# Patient Record
Sex: FEMALE | Race: WHITE | NOT HISPANIC OR LATINO | Employment: PART TIME | URBAN - METROPOLITAN AREA
[De-identification: names, ages, dates, MRNs, and addresses within clinical notes are randomized per-mention and may not be internally consistent; named-entity substitution may affect disease eponyms.]

---

## 2024-05-24 DIAGNOSIS — M26.69 TEMPOROMANDIBULAR JOINT INTERNAL DERANGEMENT: Primary | ICD-10-CM

## 2024-12-05 DIAGNOSIS — M26.623 BILATERAL TEMPOROMANDIBULAR JOINT PAIN: Primary | ICD-10-CM

## 2024-12-05 RX ORDER — CHLORHEXIDINE GLUCONATE ORAL RINSE 1.2 MG/ML
15 SOLUTION DENTAL AS NEEDED
Qty: 120 ML | Refills: 0 | Status: SHIPPED | OUTPATIENT
Start: 2024-12-05 | End: 2024-12-19

## 2024-12-05 RX ORDER — CHLORHEXIDINE GLUCONATE 40 MG/ML
SOLUTION TOPICAL DAILY
Status: SHIPPED | OUTPATIENT
Start: 2024-12-05

## 2024-12-10 ENCOUNTER — ANESTHESIA EVENT (OUTPATIENT)
Dept: OPERATING ROOM | Facility: HOSPITAL | Age: 24
End: 2024-12-10
Payer: COMMERCIAL

## 2024-12-10 RX ORDER — METHYLPHENIDATE HYDROCHLORIDE 27 MG/1
27 TABLET ORAL DAILY
COMMUNITY
Start: 2024-04-24

## 2024-12-10 RX ORDER — BUSPIRONE HYDROCHLORIDE 5 MG/1
5 TABLET ORAL 2 TIMES DAILY
COMMUNITY
Start: 2024-02-03

## 2024-12-10 NOTE — H&P
[facility] History and Physical    History Of Present Illness  Dalila Pereira is a 24 y.o. female who was a consult for evaluation for combined orthognathic/TMJ replacement surgery. She presented with years of jaw problems including pain, headaches, difficulty chewing, and pain with smiling/speaking. Symptoms have affected her QOL greatly. Her occlusion has slowly shifted and now she bites with her posterior teeth only, and that her profile is changing with her lower jaw seemingly moving backwards.       Past Medical History  She has a past medical history of ADHD, Depression, and DIOGENES (generalized anxiety disorder).    Surgical History  She has a past surgical history that includes Breast biopsy (Right, 01/08/2019).     Social History  She has no history on file for tobacco use, alcohol use, and drug use.    Family History  No family history on file.     Allergies  Patient has no known allergies.    Review of Systems  A 12-point review of systems was performed and noted be negative except for that which was mentioned in the history of present illness     Last Recorded Vitals  There were no vitals taken for this visit.     Physical Exam:  Constitutional:  No acute distress  Voice:  No hoarseness or other abnormality  Respiration:  Breathing comfortably, no stridor  Cardiovascular:  No clubbing/cyanosis/edema in hands  Eyes:  EOM intact, sclera normal  Neuro:  Alert and oriented times 3, Cranial nerves II-XII grossly intact and symmetric bilaterally  Head and Face:  Symmetric facial features  Right & Left Ear:  Normal external ear, normal hearing to voice.  Nose:  External nose midline, no bleeding or drainage  Oral Cavity/Oropharynx/Lips:  Normal mucous membranes, normal floor of mouth/tongue, no masses or lesions.   - Third molars not present  - Class II malocclusion ~10mm overjet  - JOSE 30mm  - b/l TMJ cracking during function Lt > Rt, TTP bilaterally  - Excursive and protrusive movements intact  - Orthodontic brackets in  place on upper/lower arches, square wire in place on upper  - Power chain in place on lower dentition  - Occlusal planes level  - No interdental spaces  Neck/Lymph:  No LAD, trachea midline  Psych:  Alert and oriented with appropriate mood and affect      Recent Labs:  No results found for this or any previous visit (from the past 24 hours).    Operative Plan:  Plan for bilateral temporomandibular joint arthroplasty and reconstruction with alloplastic implants, bilateral coronoidectomy, abdominal fat harvest and graft, LeFort I osteotomy, maxillomandibular fixation, possible geniohyoid myotomy, any other indicated procedures.     Dalila Garzon MD PGY1

## 2024-12-11 ENCOUNTER — HOSPITAL ENCOUNTER (OUTPATIENT)
Facility: HOSPITAL | Age: 24
LOS: 1 days | Discharge: HOME | End: 2024-12-13
Attending: DENTIST | Admitting: DENTIST
Payer: COMMERCIAL

## 2024-12-11 ENCOUNTER — ANESTHESIA (OUTPATIENT)
Dept: OPERATING ROOM | Facility: HOSPITAL | Age: 24
End: 2024-12-11
Payer: COMMERCIAL

## 2024-12-11 DIAGNOSIS — M26.01 MAXILLARY HYPERPLASIA: ICD-10-CM

## 2024-12-11 DIAGNOSIS — M26.629 ARTHRALGIA OF TEMPOROMANDIBULAR JOINT: Primary | ICD-10-CM

## 2024-12-11 DIAGNOSIS — M26.623 ARTHRALGIA OF BILATERAL TEMPOROMANDIBULAR JOINT: ICD-10-CM

## 2024-12-11 DIAGNOSIS — M26.04 MANDIBULAR HYPOPLASIA: ICD-10-CM

## 2024-12-11 DIAGNOSIS — M26.69: ICD-10-CM

## 2024-12-11 LAB
ANION GAP SERPL CALC-SCNC: 10 MMOL/L (ref 10–20)
BUN SERPL-MCNC: 16 MG/DL (ref 6–23)
CALCIUM SERPL-MCNC: 7.4 MG/DL (ref 8.6–10.3)
CHLORIDE SERPL-SCNC: 109 MMOL/L (ref 98–107)
CO2 SERPL-SCNC: 21 MMOL/L (ref 21–32)
CREAT SERPL-MCNC: 0.68 MG/DL (ref 0.5–1.05)
EGFRCR SERPLBLD CKD-EPI 2021: >90 ML/MIN/1.73M*2
GLUCOSE SERPL-MCNC: 210 MG/DL (ref 74–99)
HOLD SPECIMEN: NORMAL
POTASSIUM SERPL-SCNC: 4.1 MMOL/L (ref 3.5–5.3)
PREGNANCY TEST URINE, POC: NEGATIVE
SODIUM SERPL-SCNC: 136 MMOL/L (ref 136–145)

## 2024-12-11 PROCEDURE — 2500000004 HC RX 250 GENERAL PHARMACY W/ HCPCS (ALT 636 FOR OP/ED): Performed by: DENTIST

## 2024-12-11 PROCEDURE — 7100000001 HC RECOVERY ROOM TIME - INITIAL BASE CHARGE: Performed by: DENTIST

## 2024-12-11 PROCEDURE — 2780000003 HC OR 278 NO HCPCS: Performed by: DENTIST

## 2024-12-11 PROCEDURE — 36415 COLL VENOUS BLD VENIPUNCTURE: CPT

## 2024-12-11 PROCEDURE — A21243 PR ARTHROPLASTY TMJ+PROSTHESIS

## 2024-12-11 PROCEDURE — 1100000001 HC PRIVATE ROOM DAILY

## 2024-12-11 PROCEDURE — 3700000001 HC GENERAL ANESTHESIA TIME - INITIAL BASE CHARGE: Performed by: DENTIST

## 2024-12-11 PROCEDURE — A21243 PR ARTHROPLASTY TMJ+PROSTHESIS: Performed by: ANESTHESIOLOGY

## 2024-12-11 PROCEDURE — 2500000005 HC RX 250 GENERAL PHARMACY W/O HCPCS: Performed by: ANESTHESIOLOGY

## 2024-12-11 PROCEDURE — 2500000004 HC RX 250 GENERAL PHARMACY W/ HCPCS (ALT 636 FOR OP/ED)

## 2024-12-11 PROCEDURE — 88311 DECALCIFY TISSUE: CPT | Performed by: STUDENT IN AN ORGANIZED HEALTH CARE EDUCATION/TRAINING PROGRAM

## 2024-12-11 PROCEDURE — 2500000001 HC RX 250 WO HCPCS SELF ADMINISTERED DRUGS (ALT 637 FOR MEDICARE OP)

## 2024-12-11 PROCEDURE — 81025 URINE PREGNANCY TEST: CPT | Performed by: DENTIST

## 2024-12-11 PROCEDURE — 2500000001 HC RX 250 WO HCPCS SELF ADMINISTERED DRUGS (ALT 637 FOR MEDICARE OP): Performed by: DENTIST

## 2024-12-11 PROCEDURE — 2720000007 HC OR 272 NO HCPCS: Performed by: DENTIST

## 2024-12-11 PROCEDURE — 3600000010 HC OR TIME - EACH INCREMENTAL 1 MINUTE - PROCEDURE LEVEL FIVE: Performed by: DENTIST

## 2024-12-11 PROCEDURE — 7100000002 HC RECOVERY ROOM TIME - EACH INCREMENTAL 1 MINUTE: Performed by: DENTIST

## 2024-12-11 PROCEDURE — 88304 TISSUE EXAM BY PATHOLOGIST: CPT | Mod: TC,AHULAB | Performed by: DENTIST

## 2024-12-11 PROCEDURE — 3700000002 HC GENERAL ANESTHESIA TIME - EACH INCREMENTAL 1 MINUTE: Performed by: DENTIST

## 2024-12-11 PROCEDURE — C1713 ANCHOR/SCREW BN/BN,TIS/BN: HCPCS | Performed by: DENTIST

## 2024-12-11 PROCEDURE — 88304 TISSUE EXAM BY PATHOLOGIST: CPT | Performed by: STUDENT IN AN ORGANIZED HEALTH CARE EDUCATION/TRAINING PROGRAM

## 2024-12-11 PROCEDURE — 2500000005 HC RX 250 GENERAL PHARMACY W/O HCPCS: Performed by: DENTIST

## 2024-12-11 PROCEDURE — 80048 BASIC METABOLIC PNL TOTAL CA: CPT

## 2024-12-11 PROCEDURE — C1889 IMPLANT/INSERT DEVICE, NOC: HCPCS | Performed by: DENTIST

## 2024-12-11 PROCEDURE — A4649 SURGICAL SUPPLIES: HCPCS | Performed by: DENTIST

## 2024-12-11 PROCEDURE — 3600000005 HC OR TIME - INITIAL BASE CHARGE - PROCEDURE LEVEL FIVE: Performed by: DENTIST

## 2024-12-11 PROCEDURE — 2500000005 HC RX 250 GENERAL PHARMACY W/O HCPCS

## 2024-12-11 DEVICE — BONE SCREW, EMERGENCY, AXS, 1.9 X 5MM: Type: IMPLANTABLE DEVICE | Site: HARD PALATE | Status: FUNCTIONAL

## 2024-12-11 DEVICE — SCREW, 2 X 8 CROSS PIN: Type: IMPLANTABLE DEVICE | Site: HARD PALATE | Status: FUNCTIONAL

## 2024-12-11 DEVICE — IMPLANTABLE DEVICE: Type: IMPLANTABLE DEVICE | Site: HARD PALATE | Status: FUNCTIONAL

## 2024-12-11 DEVICE — BONE SCREW, SELF TAP, AXS, 1.7 X 5MM: Type: IMPLANTABLE DEVICE | Site: HARD PALATE | Status: FUNCTIONAL

## 2024-12-11 RX ORDER — TRIPROLIDINE/PSEUDOEPHEDRINE 2.5MG-60MG
600 TABLET ORAL 3 TIMES DAILY
Status: DISCONTINUED | OUTPATIENT
Start: 2024-12-11 | End: 2024-12-13 | Stop reason: HOSPADM

## 2024-12-11 RX ORDER — FENTANYL CITRATE 50 UG/ML
INJECTION, SOLUTION INTRAMUSCULAR; INTRAVENOUS AS NEEDED
Status: DISCONTINUED | OUTPATIENT
Start: 2024-12-11 | End: 2024-12-11

## 2024-12-11 RX ORDER — GLYCOPYRROLATE 0.2 MG/ML
INJECTION INTRAMUSCULAR; INTRAVENOUS AS NEEDED
Status: DISCONTINUED | OUTPATIENT
Start: 2024-12-11 | End: 2024-12-11

## 2024-12-11 RX ORDER — PROPOFOL 10 MG/ML
INJECTION, EMULSION INTRAVENOUS CONTINUOUS PRN
Status: DISCONTINUED | OUTPATIENT
Start: 2024-12-11 | End: 2024-12-11

## 2024-12-11 RX ORDER — WATER 1 ML/ML
IRRIGANT IRRIGATION AS NEEDED
Status: DISCONTINUED | OUTPATIENT
Start: 2024-12-11 | End: 2024-12-11 | Stop reason: HOSPADM

## 2024-12-11 RX ORDER — PHENYLEPHRINE HCL IN 0.9% NACL 0.4MG/10ML
SYRINGE (ML) INTRAVENOUS AS NEEDED
Status: DISCONTINUED | OUTPATIENT
Start: 2024-12-11 | End: 2024-12-11

## 2024-12-11 RX ORDER — ACETAMINOPHEN 325 MG/1
650 TABLET ORAL EVERY 6 HOURS
Status: DISCONTINUED | OUTPATIENT
Start: 2024-12-11 | End: 2024-12-12

## 2024-12-11 RX ORDER — ESMOLOL HYDROCHLORIDE 10 MG/ML
INJECTION INTRAVENOUS AS NEEDED
Status: DISCONTINUED | OUTPATIENT
Start: 2024-12-11 | End: 2024-12-11

## 2024-12-11 RX ORDER — ONDANSETRON HYDROCHLORIDE 2 MG/ML
INJECTION, SOLUTION INTRAVENOUS AS NEEDED
Status: DISCONTINUED | OUTPATIENT
Start: 2024-12-11 | End: 2024-12-11

## 2024-12-11 RX ORDER — ENOXAPARIN SODIUM 100 MG/ML
40 INJECTION SUBCUTANEOUS EVERY 24 HOURS
Status: DISCONTINUED | OUTPATIENT
Start: 2024-12-11 | End: 2024-12-13 | Stop reason: HOSPADM

## 2024-12-11 RX ORDER — SODIUM CHLORIDE, SODIUM LACTATE, POTASSIUM CHLORIDE, CALCIUM CHLORIDE 600; 310; 30; 20 MG/100ML; MG/100ML; MG/100ML; MG/100ML
50 INJECTION, SOLUTION INTRAVENOUS CONTINUOUS
Status: ACTIVE | OUTPATIENT
Start: 2024-12-11 | End: 2024-12-12

## 2024-12-11 RX ORDER — OXYCODONE HYDROCHLORIDE 5 MG/1
5 TABLET ORAL EVERY 6 HOURS PRN
Status: DISCONTINUED | OUTPATIENT
Start: 2024-12-11 | End: 2024-12-13 | Stop reason: HOSPADM

## 2024-12-11 RX ORDER — ACETAMINOPHEN 160 MG/5ML
650 SOLUTION ORAL EVERY 6 HOURS
Status: DISCONTINUED | OUTPATIENT
Start: 2024-12-11 | End: 2024-12-12

## 2024-12-11 RX ORDER — OXYMETAZOLINE HCL 0.05 %
SPRAY, NON-AEROSOL (ML) NASAL AS NEEDED
Status: DISCONTINUED | OUTPATIENT
Start: 2024-12-11 | End: 2024-12-11 | Stop reason: HOSPADM

## 2024-12-11 RX ORDER — ROCURONIUM BROMIDE 10 MG/ML
INJECTION, SOLUTION INTRAVENOUS AS NEEDED
Status: DISCONTINUED | OUTPATIENT
Start: 2024-12-11 | End: 2024-12-11

## 2024-12-11 RX ORDER — HYDROMORPHONE HYDROCHLORIDE 1 MG/ML
INJECTION, SOLUTION INTRAMUSCULAR; INTRAVENOUS; SUBCUTANEOUS AS NEEDED
Status: DISCONTINUED | OUTPATIENT
Start: 2024-12-11 | End: 2024-12-11

## 2024-12-11 RX ORDER — ACETAMINOPHEN 325 MG/1
650 TABLET ORAL EVERY 4 HOURS PRN
Status: DISCONTINUED | OUTPATIENT
Start: 2024-12-11 | End: 2024-12-11 | Stop reason: HOSPADM

## 2024-12-11 RX ORDER — PHENYLEPHRINE HCL IN 0.9% NACL 1 MG/10 ML
SYRINGE (ML) INTRAVENOUS AS NEEDED
Status: DISCONTINUED | OUTPATIENT
Start: 2024-12-11 | End: 2024-12-11

## 2024-12-11 RX ORDER — PROMETHAZINE HYDROCHLORIDE 25 MG/ML
6.25 INJECTION, SOLUTION INTRAMUSCULAR; INTRAVENOUS ONCE AS NEEDED
Status: DISCONTINUED | OUTPATIENT
Start: 2024-12-11 | End: 2024-12-11 | Stop reason: HOSPADM

## 2024-12-11 RX ORDER — ONDANSETRON HYDROCHLORIDE 2 MG/ML
4 INJECTION, SOLUTION INTRAVENOUS EVERY 6 HOURS PRN
Status: DISCONTINUED | OUTPATIENT
Start: 2024-12-11 | End: 2024-12-13 | Stop reason: HOSPADM

## 2024-12-11 RX ORDER — CHLORHEXIDINE GLUCONATE ORAL RINSE 1.2 MG/ML
15 SOLUTION DENTAL 3 TIMES DAILY
Status: DISCONTINUED | OUTPATIENT
Start: 2024-12-11 | End: 2024-12-13 | Stop reason: HOSPADM

## 2024-12-11 RX ORDER — OXYCODONE HYDROCHLORIDE 5 MG/1
5 TABLET ORAL EVERY 4 HOURS PRN
Status: DISCONTINUED | OUTPATIENT
Start: 2024-12-11 | End: 2024-12-11 | Stop reason: HOSPADM

## 2024-12-11 RX ORDER — LIDOCAINE HYDROCHLORIDE 20 MG/ML
INJECTION, SOLUTION INFILTRATION; PERINEURAL AS NEEDED
Status: DISCONTINUED | OUTPATIENT
Start: 2024-12-11 | End: 2024-12-11

## 2024-12-11 RX ORDER — METHOCARBAMOL 100 MG/ML
INJECTION, SOLUTION INTRAMUSCULAR; INTRAVENOUS AS NEEDED
Status: DISCONTINUED | OUTPATIENT
Start: 2024-12-11 | End: 2024-12-11

## 2024-12-11 RX ORDER — ONDANSETRON HYDROCHLORIDE 2 MG/ML
4 INJECTION, SOLUTION INTRAVENOUS ONCE AS NEEDED
Status: DISCONTINUED | OUTPATIENT
Start: 2024-12-11 | End: 2024-12-11 | Stop reason: HOSPADM

## 2024-12-11 RX ORDER — ALBUTEROL SULFATE 0.83 MG/ML
2.5 SOLUTION RESPIRATORY (INHALATION) ONCE AS NEEDED
Status: DISCONTINUED | OUTPATIENT
Start: 2024-12-11 | End: 2024-12-11 | Stop reason: HOSPADM

## 2024-12-11 RX ORDER — SUCCINYLCHOLINE CHLORIDE 20 MG/ML
INJECTION INTRAMUSCULAR; INTRAVENOUS AS NEEDED
Status: DISCONTINUED | OUTPATIENT
Start: 2024-12-11 | End: 2024-12-11

## 2024-12-11 RX ORDER — IBUPROFEN 600 MG/1
600 TABLET ORAL 3 TIMES DAILY
Status: DISCONTINUED | OUTPATIENT
Start: 2024-12-11 | End: 2024-12-13 | Stop reason: HOSPADM

## 2024-12-11 RX ORDER — MIDAZOLAM HYDROCHLORIDE 1 MG/ML
INJECTION INTRAMUSCULAR; INTRAVENOUS AS NEEDED
Status: DISCONTINUED | OUTPATIENT
Start: 2024-12-11 | End: 2024-12-11

## 2024-12-11 RX ORDER — SODIUM CHLORIDE 0.9 G/100ML
IRRIGANT IRRIGATION AS NEEDED
Status: DISCONTINUED | OUTPATIENT
Start: 2024-12-11 | End: 2024-12-11 | Stop reason: HOSPADM

## 2024-12-11 RX ORDER — DEXMEDETOMIDINE IN 0.9 % NACL 20 MCG/5ML
SYRINGE (ML) INTRAVENOUS AS NEEDED
Status: DISCONTINUED | OUTPATIENT
Start: 2024-12-11 | End: 2024-12-11

## 2024-12-11 RX ORDER — OXYCODONE HYDROCHLORIDE 5 MG/1
10 TABLET ORAL EVERY 4 HOURS PRN
Status: DISCONTINUED | OUTPATIENT
Start: 2024-12-11 | End: 2024-12-13 | Stop reason: HOSPADM

## 2024-12-11 RX ORDER — VANCOMYCIN HYDROCHLORIDE 1 G/20ML
INJECTION, POWDER, LYOPHILIZED, FOR SOLUTION INTRAVENOUS AS NEEDED
Status: DISCONTINUED | OUTPATIENT
Start: 2024-12-11 | End: 2024-12-11 | Stop reason: HOSPADM

## 2024-12-11 RX ORDER — CEFAZOLIN 1 G/1
INJECTION, POWDER, FOR SOLUTION INTRAVENOUS AS NEEDED
Status: DISCONTINUED | OUTPATIENT
Start: 2024-12-11 | End: 2024-12-11

## 2024-12-11 RX ORDER — LIDOCAINE HYDROCHLORIDE AND EPINEPHRINE 10; 10 UG/ML; MG/ML
INJECTION, SOLUTION INFILTRATION; PERINEURAL AS NEEDED
Status: DISCONTINUED | OUTPATIENT
Start: 2024-12-11 | End: 2024-12-11 | Stop reason: HOSPADM

## 2024-12-11 RX ORDER — PROPOFOL 10 MG/ML
INJECTION, EMULSION INTRAVENOUS AS NEEDED
Status: DISCONTINUED | OUTPATIENT
Start: 2024-12-11 | End: 2024-12-11

## 2024-12-11 RX ORDER — OXYMETAZOLINE HCL 0.05 %
SPRAY, NON-AEROSOL (ML) NASAL AS NEEDED
Status: DISCONTINUED | OUTPATIENT
Start: 2024-12-11 | End: 2024-12-11

## 2024-12-11 RX ADMIN — SODIUM CHLORIDE, POTASSIUM CHLORIDE, SODIUM LACTATE AND CALCIUM CHLORIDE 50 ML/HR: 600; 310; 30; 20 INJECTION, SOLUTION INTRAVENOUS at 18:58

## 2024-12-11 RX ADMIN — CHLORHEXIDINE GLUCONATE 15 ML: 1.2 SOLUTION ORAL at 21:24

## 2024-12-11 RX ADMIN — OXYCODONE HYDROCHLORIDE 5 MG: 5 TABLET ORAL at 21:23

## 2024-12-11 RX ADMIN — IBUPROFEN 600 MG: 600 TABLET, FILM COATED ORAL at 21:23

## 2024-12-11 RX ADMIN — DEXAMETHASONE SODIUM PHOSPHATE 8 MG: 4 INJECTION, SOLUTION INTRAMUSCULAR; INTRAVENOUS at 21:14

## 2024-12-11 RX ADMIN — ENOXAPARIN SODIUM 40 MG: 40 INJECTION SUBCUTANEOUS at 21:24

## 2024-12-11 RX ADMIN — ACETAMINOPHEN 650 MG: 325 TABLET, FILM COATED ORAL at 21:26

## 2024-12-11 RX ADMIN — Medication 6 L/MIN: at 17:10

## 2024-12-11 RX ADMIN — AMPICILLIN SODIUM AND SULBACTAM SODIUM 3 G: 2; 1 INJECTION, POWDER, FOR SOLUTION INTRAMUSCULAR; INTRAVENOUS at 23:59

## 2024-12-11 SDOH — SOCIAL STABILITY: SOCIAL INSECURITY: ABUSE: ADULT

## 2024-12-11 SDOH — SOCIAL STABILITY: SOCIAL INSECURITY: DO YOU FEEL ANYONE HAS EXPLOITED OR TAKEN ADVANTAGE OF YOU FINANCIALLY OR OF YOUR PERSONAL PROPERTY?: NO

## 2024-12-11 SDOH — SOCIAL STABILITY: SOCIAL INSECURITY: WERE YOU ABLE TO COMPLETE ALL THE BEHAVIORAL HEALTH SCREENINGS?: YES

## 2024-12-11 SDOH — SOCIAL STABILITY: SOCIAL INSECURITY: HAVE YOU HAD ANY THOUGHTS OF HARMING ANYONE ELSE?: NO

## 2024-12-11 SDOH — ECONOMIC STABILITY: INCOME INSECURITY: IN THE PAST 12 MONTHS HAS THE ELECTRIC, GAS, OIL, OR WATER COMPANY THREATENED TO SHUT OFF SERVICES IN YOUR HOME?: NO

## 2024-12-11 SDOH — ECONOMIC STABILITY: HOUSING INSECURITY: IN THE LAST 12 MONTHS, WAS THERE A TIME WHEN YOU WERE NOT ABLE TO PAY THE MORTGAGE OR RENT ON TIME?: NO

## 2024-12-11 SDOH — SOCIAL STABILITY: SOCIAL INSECURITY: HAS ANYONE EVER THREATENED TO HURT YOUR FAMILY OR YOUR PETS?: NO

## 2024-12-11 SDOH — SOCIAL STABILITY: SOCIAL INSECURITY
WITHIN THE LAST YEAR, HAVE YOU BEEN RAPED OR FORCED TO HAVE ANY KIND OF SEXUAL ACTIVITY BY YOUR PARTNER OR EX-PARTNER?: NO

## 2024-12-11 SDOH — SOCIAL STABILITY: SOCIAL INSECURITY
WITHIN THE LAST YEAR, HAVE YOU BEEN KICKED, HIT, SLAPPED, OR OTHERWISE PHYSICALLY HURT BY YOUR PARTNER OR EX-PARTNER?: NO

## 2024-12-11 SDOH — ECONOMIC STABILITY: HOUSING INSECURITY: AT ANY TIME IN THE PAST 12 MONTHS, WERE YOU HOMELESS OR LIVING IN A SHELTER (INCLUDING NOW)?: NO

## 2024-12-11 SDOH — SOCIAL STABILITY: SOCIAL INSECURITY: HAVE YOU HAD THOUGHTS OF HARMING ANYONE ELSE?: NO

## 2024-12-11 SDOH — SOCIAL STABILITY: SOCIAL INSECURITY: ARE THERE ANY APPARENT SIGNS OF INJURIES/BEHAVIORS THAT COULD BE RELATED TO ABUSE/NEGLECT?: NO

## 2024-12-11 SDOH — ECONOMIC STABILITY: FOOD INSECURITY: HOW HARD IS IT FOR YOU TO PAY FOR THE VERY BASICS LIKE FOOD, HOUSING, MEDICAL CARE, AND HEATING?: NOT HARD AT ALL

## 2024-12-11 SDOH — SOCIAL STABILITY: SOCIAL INSECURITY: WITHIN THE LAST YEAR, HAVE YOU BEEN AFRAID OF YOUR PARTNER OR EX-PARTNER?: NO

## 2024-12-11 SDOH — SOCIAL STABILITY: SOCIAL INSECURITY: WITHIN THE LAST YEAR, HAVE YOU BEEN HUMILIATED OR EMOTIONALLY ABUSED IN OTHER WAYS BY YOUR PARTNER OR EX-PARTNER?: NO

## 2024-12-11 SDOH — ECONOMIC STABILITY: FOOD INSECURITY: WITHIN THE PAST 12 MONTHS, THE FOOD YOU BOUGHT JUST DIDN'T LAST AND YOU DIDN'T HAVE MONEY TO GET MORE.: NEVER TRUE

## 2024-12-11 SDOH — ECONOMIC STABILITY: FOOD INSECURITY: WITHIN THE PAST 12 MONTHS, YOU WORRIED THAT YOUR FOOD WOULD RUN OUT BEFORE YOU GOT THE MONEY TO BUY MORE.: NEVER TRUE

## 2024-12-11 SDOH — ECONOMIC STABILITY: TRANSPORTATION INSECURITY: IN THE PAST 12 MONTHS, HAS LACK OF TRANSPORTATION KEPT YOU FROM MEDICAL APPOINTMENTS OR FROM GETTING MEDICATIONS?: NO

## 2024-12-11 SDOH — ECONOMIC STABILITY: HOUSING INSECURITY: IN THE PAST 12 MONTHS, HOW MANY TIMES HAVE YOU MOVED WHERE YOU WERE LIVING?: 0

## 2024-12-11 SDOH — SOCIAL STABILITY: SOCIAL INSECURITY: ARE YOU OR HAVE YOU BEEN THREATENED OR ABUSED PHYSICALLY, EMOTIONALLY, OR SEXUALLY BY ANYONE?: NO

## 2024-12-11 SDOH — SOCIAL STABILITY: SOCIAL INSECURITY: DOES ANYONE TRY TO KEEP YOU FROM HAVING/CONTACTING OTHER FRIENDS OR DOING THINGS OUTSIDE YOUR HOME?: NO

## 2024-12-11 SDOH — SOCIAL STABILITY: SOCIAL INSECURITY: DO YOU FEEL UNSAFE GOING BACK TO THE PLACE WHERE YOU ARE LIVING?: NO

## 2024-12-11 ASSESSMENT — COGNITIVE AND FUNCTIONAL STATUS - GENERAL
WALKING IN HOSPITAL ROOM: A LITTLE
MOBILITY SCORE: 24
DAILY ACTIVITIY SCORE: 24
DAILY ACTIVITIY SCORE: 24
PATIENT BASELINE BEDBOUND: NO
MOBILITY SCORE: 23

## 2024-12-11 ASSESSMENT — LIFESTYLE VARIABLES
HOW MANY STANDARD DRINKS CONTAINING ALCOHOL DO YOU HAVE ON A TYPICAL DAY: PATIENT DOES NOT DRINK
HOW OFTEN DO YOU HAVE A DRINK CONTAINING ALCOHOL: NEVER
AUDIT-C TOTAL SCORE: 0
SKIP TO QUESTIONS 9-10: 1
HOW OFTEN DO YOU HAVE 6 OR MORE DRINKS ON ONE OCCASION: NEVER
AUDIT-C TOTAL SCORE: 0

## 2024-12-11 ASSESSMENT — PATIENT HEALTH QUESTIONNAIRE - PHQ9
2. FEELING DOWN, DEPRESSED OR HOPELESS: NOT AT ALL
1. LITTLE INTEREST OR PLEASURE IN DOING THINGS: NOT AT ALL
SUM OF ALL RESPONSES TO PHQ9 QUESTIONS 1 & 2: 0

## 2024-12-11 ASSESSMENT — PAIN SCALES - GENERAL
PAINLEVEL_OUTOF10: 0 - NO PAIN
PAINLEVEL_OUTOF10: 6
PAINLEVEL_OUTOF10: 0 - NO PAIN

## 2024-12-11 ASSESSMENT — ACTIVITIES OF DAILY LIVING (ADL)
LACK_OF_TRANSPORTATION: NO
TOILETING: NEEDS ASSISTANCE
BATHING: INDEPENDENT
FEEDING YOURSELF: NEEDS ASSISTANCE
GROOMING: INDEPENDENT
LACK_OF_TRANSPORTATION: NO
WALKS IN HOME: INDEPENDENT
HEARING - RIGHT EAR: FUNCTIONAL
ADEQUATE_TO_COMPLETE_ADL: YES
PATIENT'S MEMORY ADEQUATE TO SAFELY COMPLETE DAILY ACTIVITIES?: YES
DRESSING YOURSELF: INDEPENDENT
HEARING - LEFT EAR: FUNCTIONAL
JUDGMENT_ADEQUATE_SAFELY_COMPLETE_DAILY_ACTIVITIES: YES

## 2024-12-11 ASSESSMENT — PAIN - FUNCTIONAL ASSESSMENT
PAIN_FUNCTIONAL_ASSESSMENT: UNABLE TO SELF-REPORT
PAIN_FUNCTIONAL_ASSESSMENT: 0-10
PAIN_FUNCTIONAL_ASSESSMENT: UNABLE TO SELF-REPORT
PAIN_FUNCTIONAL_ASSESSMENT: 0-10
PAIN_FUNCTIONAL_ASSESSMENT: UNABLE TO SELF-REPORT
PAIN_FUNCTIONAL_ASSESSMENT: 0-10
PAIN_FUNCTIONAL_ASSESSMENT: 0-10

## 2024-12-11 ASSESSMENT — COLUMBIA-SUICIDE SEVERITY RATING SCALE - C-SSRS
6. HAVE YOU EVER DONE ANYTHING, STARTED TO DO ANYTHING, OR PREPARED TO DO ANYTHING TO END YOUR LIFE?: NO
2. HAVE YOU ACTUALLY HAD ANY THOUGHTS OF KILLING YOURSELF?: NO
1. IN THE PAST MONTH, HAVE YOU WISHED YOU WERE DEAD OR WISHED YOU COULD GO TO SLEEP AND NOT WAKE UP?: NO

## 2024-12-11 NOTE — ANESTHESIA POSTPROCEDURE EVALUATION
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])      (Arthralgia of TMT (left, right bilat) [M26.62])      (Internal Derangement other Specified disorders TMJ [M26.69])    Surgeons: Carlo Medley MD, DDS Responsible Provider: Shyam Machado MD    Anesthesia Type: general ASA Status: 2            Anesthesia Type: general    Vitals Value Taken Time   /75 12/11/24 1815   Temp 36.2 °C (97.2 °F) 12/11/24 1815   Pulse 64 12/11/24 1815   Resp 12 12/11/24 1815   SpO2 93 % 12/11/24 1815       Anesthesia Post Evaluation    Patient location during evaluation: PACU  Patient participation: complete - patient participated  Level of consciousness: awake and alert  Pain management: adequate  Airway patency: patent  Cardiovascular status: acceptable and hemodynamically stable  Respiratory status: acceptable, spontaneous ventilation and nonlabored ventilation  Hydration status: acceptable  Postoperative Nausea and Vomiting: none        There were no known notable events for this encounter.

## 2024-12-11 NOTE — OP NOTE
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]     * Arthralgia of bilateral temporomandibular joint [M26.623]     * 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.69]     Procedures  Arthroplasty Temporomandibular Joint; Excision Adipose Tissue Graft Head/Neck; Application of Bilateral Arch Bars  83840 - [place] ARTHRP TMPRMAND JOINT W/PROSTHETIC REPLACEMENT    Arthroplasty Temporomandibular Joint; Excision Adipose Tissue Graft Head/Neck; Application of Bilateral Arch Bars  78285 - [place] GRAFTING OF AUTOLOGOUS SOFT TISS BY DIRECT EXC    Arthroplasty Temporomandibular Joint; Excision Adipose Tissue Graft Head/Neck; Application of Bilateral Arch Bars  49464 - [place] APPL INTERDENTAL FIXATION DEVICE NON-FX/DISLC    Arthroplasty Temporomandibular Joint; Excision Adipose Tissue Graft Head/Neck; Application of Bilateral Arch Bars  43566 - [place] IMPRESSION & PREPARATION ORAL SURGICAL SPLINT    [place] I Osteotomy  85055 - [place] RCNSTJ MIDFACE LEFORT I 3/> PIECE W/O BONE GRAFT      Surgeons      * Carlo Medley - Primary    Resident/Fellow/Other Assistant:  Víctor Briceno MD DMD   Hemal Leija MD DMD   Dalila Garzon MD     Staff:   Circulator: Anca  Scrub Person: Karoline  Relief Scrub: Taryn  Relief Circulator: Sussy  Relief Scrub: Orlando  Relief Circulator: Camilla    Anesthesia Staff: Anesthesiologist: Antonio Ryan MD; Shyam Machado MD  CRNA: Amish KOENIG APRN-CRNA  C-AA: IRMA Baer; Thiago  HELGA Vasquez, [facility]; Lesli Moser [facility]    Procedure Summary  Anesthesia: General  ASA: II  Estimated Blood Loss: 150 mL  Intra-op Medications:   Administrations occurring from 0730 to 1530 on 12/11/24:   Medication Name Total Dose   sodium chloride 0.9 % irrigation solution 6,000 mL   oxymetazoline (Afrin) 0.05 % nasal spray 10 mL   vancomycin (Vancocin) vial for injection 1 g   sterile water irrigation solution 500 mL   ceFAZolin (Ancef) vial 1 g 4 g   dexAMETHasone (Decadron) injection 4 mg/mL 10 mg   esmolol (Brevibloc) injection 90 mg   fentaNYL (Sublimaze) injection 50 mcg/mL 100 mcg   glycopyrrolate (Robinul) injection 0.2 mg/mL 0.2 mg   HYDROmorphone (Dilaudid) injection 1 mg/mL 2 mg   ketamine injection 50 mg/ 5 mL (10 mg/mL) 50 mg   LR bolus Cannot be calculated   lidocaine (Xylocaine) injection 2 % 100 mg   midazolam PF (Versed) injection 1 mg/mL 2 mg   oxymetazoline (Afrin) nasal spray 0.05 % 2 spray   phenylephrine 100 mcg/mL syringe 10 mL (prefilled) 1,500 mcg   propofol (Diprivan) infusion 10 mg/mL 1,290.12 mg   remifentanil (Ultiva) 1,000 mcg in sodium chloride 0.9% 50 mL (20 mcg/mL) infusion 2 mg   rocuronium 10 mg/mL 70 mg   NaCl 0.9 % bolus Cannot be calculated   succinylcholine 20 mg/mL vial 120 mg              Anesthesia Record               Intraprocedure I/O Totals          Intake    LR bolus 1000.00 mL    NaCl 0.9 % bolus 500.00 mL    Remifentanil Drip 0.00 mL    The total shown is the total volume documented since Anesthesia Start was filed.    Propofol Drip 0.00 mL    The total shown is the total volume documented since Anesthesia Start was filed.    Total Intake 1500 mL       Output    Urine 600 mL    Est. Blood Loss 200 mL    Total Output 800 mL       Net    Net Volume 700 mL          Specimen:   ID Type Source Tests Collected by Time   1 : Right Temporomandibular Joint Tissue MANDIBLE RIGHT RESECTION SURGICAL PATHOLOGY EXAM Anca Murphy RN 12/11/2024 1013   2 : Left Temporomandibular Joint  Tissue MANDIBLE LEFT RESECTION SURGICAL PATHOLOGY EXAM Anca Murphy RN 12/11/2024 1014   3 : Right Condylar disc Tissue MANDIBLE RIGHT RESECTION SURGICAL PATHOLOGY EXAM Anca Murphy RN 12/11/2024 1022                 Drains and/or Catheters:   [REMOVED] Urethral Catheter Non-latex 16 Fr. (Removed)       Tourniquet Times:         Implants:  Implants       Type Name Action Serial No.      Plate faceid orthog Plates Implanted NA     Screw SCREW, 2 X 8 CROSS PIN - SNA - [id number] Implanted NA     Other BILATERAL TMJ Implanted NA     Screw BONE SCREW, SELF TAP, AXS, 1.7 X 5MM - SN/A - [id number] Implanted N/A     Screw BONE SCREW, SELF DRILL, AXS, 1.7 X 5MM - SN/A - [id number] Implanted N/A     Screw BONE SCREW, SELF TAP, AXS, 1.7 X 8MM - SN/A - [id number] Wasted N/A     Screw BONE SCREW, EMERGENCY, AXS, 1.9 X 5MM - SN/A - [id number] Implanted N/A              Findings:     Indications: Dalila Pereira is an 24 y.o. 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     The patient was seen in the preoperative area. The risks, benefits, complications, treatment options, non-operative alternatives, expected recovery and outcomes were discussed with the patient. The possibilities of reaction to medication, pulmonary aspiration, injury to surrounding structures, bleeding, recurrent infection, the need for additional procedures, failure to diagnose a condition, and creating a complication requiring transfusion or operation were discussed with the patient. The patient concurred with the proposed plan, giving informed consent.  The site of surgery was properly noted/marked if necessary per policy. The patient has been actively warmed in preoperative area. Preoperative antibiotics have been ordered and given within 1 hours of incision. Venous thrombosis prophylaxis are not  indicated.    Procedure Details:     he patient was greeted in the main operating room holding area where identification was verified, health history updated, and any questions presented were answered to apparent satisfaction.  The procedure was reviewed, consents and surgical site verified and the patient expressed desires to proceed as planned. Anesthesia evaluated and deemed the patient appropriate to proceed, then transported the patient to the operating room.     The patient was placed on the operating room table in the supine position, secured with a security strap, and sequential compression devices were placed on the calves. General anesthesia was induced and a successful nasal-endotracheal intubation was achieved. Sideburn hair near the bilateral TMJ surgical area was shaved with clippers and isolated with tape. A head wrap consisting of a blue towel, foam rest, and silk tape was used to protect the patient's scalp and forehead in addition to providing anchorage to secure the endotracheal tube. The eyes were then secured closed with tegaderm.  The posterior oropharynx was suctioned and a moistened ray-inés throat pack was placed. A surgical time out was performed.     Attention was then directed to the oral cavity, and maxillary and mandibular MMF screws were placed, three on the maxilla , three on the mandible.  The patient's abdomen was then prepped with betadine. The patient's head and neck were prepped with betadine scrub and paint. The patient was then draped in sterile fashion for an open TMJ procedure, with an Ioban dressing placed over the patient's mouth to ensure maintenance of a sterile field. Xerofoam gauze was then placed in bilateral ears and removed at the end of the procedure.       Attention was then directed on the left retromandibular incision. A surgical marking pen was used to delineate the proposed surgical site, posterior to left mandibular angle, 2 cm away from the posterior/inferior  border of the mandible, 1 cm inferior to the ear lobule, and 2 cm inferior to the endaural incision. A #15 blade was used to incise through skin and subcutaneous tissue, down to the platysmal layer. The platysma was then tented upwards and incised with sharp dissection, with the utilization of facial nerve monitoring. Portable nerve stimulator was set at 1.0mA and used as indicated throughout the dissection. The superficial layer of the deep cervical fascia was incised with #15 blade. The posterior border of the mandible was identified and the pterygomasseteric sling was incised with electrocautery, with careful retraction with malleable retractors. Subperiosteal dissection continued on the lateral aspect of the mandible superiorly towards the joint space until both spaces were continuous. The [employer] surgical guide was then placed for the resection of the condylar segment.       A similar approach on the right was taken similar to the right retromandibular/submandibular incision site. The nerve stimulator was also available to identify the marginal mandibular nerve as needed throughout the dissection. The right condylar resection guide was then placed and secured with screws.      Attention was then directed to the left TMJ procedure, local anesthesia was infiltrated with 2% lidocaine w/ 1:100K epinephrine. A 15 blade was then used to make an pre-auricular incision from the left earlobe lobe to the root of the helix.  Sharp dissection was done anterior to the tragal cartilage in the supraperichondrial plane down to the zygomatic arch. At the superior aspect of the incision, blunt dissection continued until the superficial layer of the temporalis was identified and the regional tissue undermined. Hemostasis obtained with electrocautery. The periosteum overlying the zygomatic arch was incised, and subperiosteal dissection continued to the anterior aspect of the zygomatic arch. Blunt dissection continued inferiorly  over the lateral capsule. A vertical incision was made over the capsule and dissection into the superior and inferior joint spaces isolated the TMJ disc.  The disc was later excised. The site was packed with ray-inés gauze to facilitate additional hemostasis.     From the pre-auricular surgical site, condylar retractors were placed to isolate the condyle. The condylar head was then sectioned utilizing Sonopet ultrasonic surgery under saline irrigation. Hemostasis obtained with temporary surgical packing. Sequential sectioning was performed until the condyle was removed according to presurgical planning. The resection guides were then removed bilaterally. The remainder of the TMJ disc and noah-discal tissue was excised, and the glenoid fossa and articular eminence was relieved of tissue utilizing #9 periosteal elevator.  A similar approach was taken with the right pre-auricular approach to the condyle and resection of the condyle.    Towels, Ioban, and a drape was placed over bilateral TMJ surgical areas. Attention was then directed intraorally. Patient was placed in occlusion and 25 gauge wires were utilized to secure the occlusion. The surgical team donned new gloves and gowns.      The right  fossa component was seated and fixated with the assistance of the condylar seating tool. The fossa component was secured with pre-planned fixation screws, with screws placed at this time under copious irrigation. The condylar component was seated onto the mandible, and its position was verified to be seated within the posterior aspect of the fossa component. It was then secured with planned bicortical screws, per- instructions, drilled under copious NS irrigation.  The same approach was done to place and secure the left TMJ fossa and condylar components. Both surgical sites were then again isolated.     Attention was then focused back to the oral cavity where the patient was cut out from intermaxillary intermediate  fixation.  The occlusion was evaluated and found to be as planned during virtual surgical planning.       Attention was then turned to the maxilla. A circumferential vestibular incision was made in the maxillary vestibule using electrocautery, 5 mm above attached mucosa. Incision was carried through periosteum down to bone. A #9 periosteal elevator was then used to continue the dissection subperiosteally to expose the anterior and lateral aspects of the maxilla. Dissection was then carried posteriorly and the lateral aspect of the pterygoid plates were exposed. The nasal mucosa was then bluntly dissected using a nasal freer. Afrin soaked cotton pledgets where then carefully placed along the nasal floor to aid in hemostasis. The maxillary guides were placed to the right and left maxilla, secured with screws and all predictive holes predrilled. A reciprocating saw was then used to create the LeFort I level osteotomy bilaterally along the osteotomy guide.   Afrin soaked cotton pledgets were removed. A double guarded osteotome was used to separate the nasal septum in a posterior and inferior direction. A single guarded osteotome was used to separate the lateral nasal walls directing the osteotome in an inferior and posterior direction. An osteotome was then used to separate the pterygoid plates directing the osteotome in an anterior, medial, and inferior direction. Then, under hypotensive anesthesia, using a combination of digital downward pressure, a bone hook, and smith , the maxilla was downfractured and mobilized in the anterior, posterior, and lateral directions using the disimpacting forceps. The floor of the nasal cavity was leveled with a rongeur.    Confirming satisfactory position of dental occlusion and position of maxilla, patient was again wired into final occlusion. The maxilla was fixated while making sure condyles seated in correct position using  custom plate and screws which spanned the nasal  maxillary buttress and the zygomatic maxillary buttress.     An alar cinch was performed using 2-0 vicryl suture. V-Y closure was performed for closure of the maxillary circumvestibular incision with 4-0 vicryl suture and 3-0 running chromic suture.     The surgical team donned new gloves and gowns. A new Ioban was placed over the mouth to re-establish the sterile field. All remaining TMJ concepts fossa and condylar screws were then drilled and placed under copious irrigation.         [id number] Autogenous fat grafts were harvested from the existing neck incisions bilaterally by undermining dermis and removing excess supraplastysmal fat.    Attention was then directed to the abdominal fat harvest procedure. Utilizing a #15 blade, a 2cm incision was made in the right paramedian region, and the incision was carried down to subcutaneous fat with electrocautery. Sharp dissection with facelift scissors continued to establish an undermined lipocutaneous flap . Hemostasis obtained with electrocautery. Two 4x2cm blocks of fat were harvested and placed in saline. The site was irrigated, and the incision was closed in layers. Deep with 4-0 Monocryl, and epidermis with running 5-0 fast gut.          The harvested autogenous fat was packed around the condyles of bilateral prosthetic joints.  Fosseal was placed in bilateral preauricular surgical sites for hemostasis. The preauricular sites were closed in layers, deep with 3-0 Vicryl suture. The retromandibular/submandibular incisions were closed in layers with 3-0 Vicryl. Running 5-0 fast suture was placed to close the skin.     The patient emerged from general anesthesia, and the patient was extubated in the operating room.  The patient tolerated the surgery and the anesthesia well and was transported to the Post-Anesthesia Care Unit where the patient maintained normal stable vital signs. There were no procedural or anesthetic complications. This concluded the surgical  procedures.  Complications:  None; patient tolerated the procedure well.    Disposition: PACU - hemodynamically stable.  Condition: stable                 Additional Details:     Attending Attestation:     Carlo Medley  Phone Number: [phone]

## 2024-12-11 NOTE — ANESTHESIA PROCEDURE NOTES
Airway  Date/Time: 12/11/2024 8:15 AM  Urgency: elective    Airway not difficult    Staffing  Performed: CRNA   Authorized by: Antonio Ryan MD    Performed by: MEGAN Luther  Patient location during procedure: OR    Indications and Patient Condition  Indications for airway management: anesthesia and airway protection  Spontaneous Ventilation: absent  Sedation level: deep  Preoxygenated: yes  Patient position: sniffing  Mask difficulty assessment: 1 - vent by mask  Planned trial extubation    Final Airway Details  Final airway type: endotracheal airway      Successful airway: LISA tube  Cuffed: yes   Successful intubation technique: direct laryngoscopy  Facilitating devices/methods: NPA and Magill forceps  Endotracheal tube insertion site: right naris  Blade: Dirk  Blade size: #3  Cormack-Lehane Classification: grade IIa - partial view of glottis  Placement verified by: chest auscultation and capnometry   Measured from: nares  ETT to nares (cm): 25  Number of attempts at approach: 1  Ventilation between attempts: none  Number of other approaches attempted: 0

## 2024-12-11 NOTE — CARE PLAN
The patient's goals for the shift include      The clinical goals for the shift include remain safe and manage pain to end of shift      Problem: Pain - Adult  Goal: Verbalizes/displays adequate comfort level or baseline comfort level  Outcome: Progressing     Problem: Safety - Adult  Goal: Free from fall injury  Outcome: Progressing     Problem: Discharge Planning  Goal: Discharge to home or other facility with appropriate resources  Outcome: Progressing     Problem: Chronic Conditions and Co-morbidities  Goal: Patient's chronic conditions and co-morbidity symptoms are monitored and maintained or improved  Outcome: Progressing

## 2024-12-11 NOTE — NURSING NOTE
1710: Patient to bay with anesthesia and surgical team present. Handoff report and plan of care reviewed, all questions answered. VSS.    1723: Family updated via text message    1728: Residents at bedside assessing patient. Patient alert and able to raise eyebrows bilaterally. Denies pain and nausea.    1730: Patient placed on 4L nasal cannula    1804: Report sent to Janis DIXON (room 703 nurse) at this time via secure chat    1805: Family updated via text message    1829: Patient to room 703 in stable condition with all belongings via transport    1830: Family updated via phone call

## 2024-12-12 ENCOUNTER — APPOINTMENT (OUTPATIENT)
Dept: RADIOLOGY | Facility: HOSPITAL | Age: 24
End: 2024-12-12
Payer: COMMERCIAL

## 2024-12-12 PROCEDURE — 7100000011 HC EXTENDED STAY RECOVERY HOURLY - NURSING UNIT

## 2024-12-12 PROCEDURE — 2500000001 HC RX 250 WO HCPCS SELF ADMINISTERED DRUGS (ALT 637 FOR MEDICARE OP)

## 2024-12-12 PROCEDURE — 2500000004 HC RX 250 GENERAL PHARMACY W/ HCPCS (ALT 636 FOR OP/ED)

## 2024-12-12 PROCEDURE — 76376 3D RENDER W/INTRP POSTPROCES: CPT | Performed by: RADIOLOGY

## 2024-12-12 PROCEDURE — 70486 CT MAXILLOFACIAL W/O DYE: CPT | Performed by: RADIOLOGY

## 2024-12-12 PROCEDURE — 70486 CT MAXILLOFACIAL W/O DYE: CPT

## 2024-12-12 PROCEDURE — 96372 THER/PROPH/DIAG INJ SC/IM: CPT

## 2024-12-12 RX ORDER — AMOXICILLIN 250 MG
1 CAPSULE ORAL NIGHTLY
Status: DISCONTINUED | OUTPATIENT
Start: 2024-12-12 | End: 2024-12-13 | Stop reason: HOSPADM

## 2024-12-12 RX ORDER — BUSPIRONE HYDROCHLORIDE 5 MG/1
5 TABLET ORAL 2 TIMES DAILY
Status: DISCONTINUED | OUTPATIENT
Start: 2024-12-12 | End: 2024-12-13 | Stop reason: HOSPADM

## 2024-12-12 RX ORDER — ACETAMINOPHEN 160 MG/5ML
650 SOLUTION ORAL EVERY 6 HOURS
Status: DISCONTINUED | OUTPATIENT
Start: 2024-12-12 | End: 2024-12-13 | Stop reason: HOSPADM

## 2024-12-12 RX ORDER — ACETAMINOPHEN 325 MG/1
650 TABLET ORAL EVERY 6 HOURS
Status: DISCONTINUED | OUTPATIENT
Start: 2024-12-12 | End: 2024-12-13 | Stop reason: HOSPADM

## 2024-12-12 RX ORDER — METHYLPHENIDATE HYDROCHLORIDE 27 MG/1
27 TABLET ORAL DAILY
Status: DISCONTINUED | OUTPATIENT
Start: 2024-12-12 | End: 2024-12-12

## 2024-12-12 RX ORDER — OXYMETAZOLINE HCL 0.05 %
2 SPRAY, NON-AEROSOL (ML) NASAL EVERY 12 HOURS PRN
Status: DISCONTINUED | OUTPATIENT
Start: 2024-12-12 | End: 2024-12-13 | Stop reason: HOSPADM

## 2024-12-12 RX ADMIN — ENOXAPARIN SODIUM 40 MG: 40 INJECTION SUBCUTANEOUS at 20:24

## 2024-12-12 RX ADMIN — IBUPROFEN 600 MG: 600 TABLET, FILM COATED ORAL at 20:24

## 2024-12-12 RX ADMIN — OXYCODONE HYDROCHLORIDE 5 MG: 5 TABLET ORAL at 16:29

## 2024-12-12 RX ADMIN — SENNOSIDES AND DOCUSATE SODIUM 1 TABLET: 50; 8.6 TABLET ORAL at 20:24

## 2024-12-12 RX ADMIN — OXYCODONE HYDROCHLORIDE 10 MG: 5 TABLET ORAL at 20:25

## 2024-12-12 RX ADMIN — OXYMETAZOLINE HYDROCHLORIDE 2 SPRAY: 0.05 SPRAY NASAL at 11:54

## 2024-12-12 RX ADMIN — DEXAMETHASONE SODIUM PHOSPHATE 8 MG: 4 INJECTION, SOLUTION INTRAMUSCULAR; INTRAVENOUS at 21:47

## 2024-12-12 RX ADMIN — CHLORHEXIDINE GLUCONATE 15 ML: 1.2 SOLUTION ORAL at 16:29

## 2024-12-12 RX ADMIN — IBUPROFEN 600 MG: 600 TABLET, FILM COATED ORAL at 16:28

## 2024-12-12 RX ADMIN — DEXAMETHASONE SODIUM PHOSPHATE 8 MG: 4 INJECTION, SOLUTION INTRAMUSCULAR; INTRAVENOUS at 13:29

## 2024-12-12 RX ADMIN — BUSPIRONE HYDROCHLORIDE 5 MG: 5 TABLET ORAL at 20:24

## 2024-12-12 RX ADMIN — AMPICILLIN SODIUM AND SULBACTAM SODIUM 3 G: 2; 1 INJECTION, POWDER, FOR SOLUTION INTRAMUSCULAR; INTRAVENOUS at 17:55

## 2024-12-12 RX ADMIN — DEXAMETHASONE SODIUM PHOSPHATE 8 MG: 4 INJECTION, SOLUTION INTRAMUSCULAR; INTRAVENOUS at 05:48

## 2024-12-12 RX ADMIN — IBUPROFEN 600 MG: 600 TABLET, FILM COATED ORAL at 08:44

## 2024-12-12 RX ADMIN — AMPICILLIN SODIUM AND SULBACTAM SODIUM 3 G: 2; 1 INJECTION, POWDER, FOR SOLUTION INTRAMUSCULAR; INTRAVENOUS at 05:37

## 2024-12-12 RX ADMIN — OXYCODONE HYDROCHLORIDE 10 MG: 5 TABLET ORAL at 11:16

## 2024-12-12 RX ADMIN — ACETAMINOPHEN 650 MG: 650 LIQUID ORAL at 05:36

## 2024-12-12 RX ADMIN — ACETAMINOPHEN 650 MG: 325 TABLET, FILM COATED ORAL at 17:55

## 2024-12-12 RX ADMIN — ACETAMINOPHEN 650 MG: 325 TABLET, FILM COATED ORAL at 11:16

## 2024-12-12 RX ADMIN — CHLORHEXIDINE GLUCONATE 15 ML: 1.2 SOLUTION ORAL at 08:44

## 2024-12-12 RX ADMIN — CHLORHEXIDINE GLUCONATE 15 ML: 1.2 SOLUTION ORAL at 20:24

## 2024-12-12 RX ADMIN — OXYCODONE HYDROCHLORIDE 5 MG: 5 TABLET ORAL at 05:36

## 2024-12-12 RX ADMIN — BUSPIRONE HYDROCHLORIDE 5 MG: 5 TABLET ORAL at 08:44

## 2024-12-12 RX ADMIN — AMPICILLIN SODIUM AND SULBACTAM SODIUM 3 G: 2; 1 INJECTION, POWDER, FOR SOLUTION INTRAMUSCULAR; INTRAVENOUS at 11:24

## 2024-12-12 SDOH — HEALTH STABILITY: MENTAL HEALTH: HOW OFTEN DO YOU HAVE SIX OR MORE DRINKS ON ONE OCCASION?: NEVER

## 2024-12-12 SDOH — ECONOMIC STABILITY: INCOME INSECURITY: IN THE PAST 12 MONTHS HAS THE ELECTRIC, GAS, OIL, OR WATER COMPANY THREATENED TO SHUT OFF SERVICES IN YOUR HOME?: NO

## 2024-12-12 SDOH — ECONOMIC STABILITY: HOUSING INSECURITY: AT ANY TIME IN THE PAST 12 MONTHS, WERE YOU HOMELESS OR LIVING IN A SHELTER (INCLUDING NOW)?: NO

## 2024-12-12 SDOH — ECONOMIC STABILITY: HOUSING INSECURITY: IN THE LAST 12 MONTHS, WAS THERE A TIME WHEN YOU WERE NOT ABLE TO PAY THE MORTGAGE OR RENT ON TIME?: NO

## 2024-12-12 SDOH — ECONOMIC STABILITY: TRANSPORTATION INSECURITY: IN THE PAST 12 MONTHS, HAS LACK OF TRANSPORTATION KEPT YOU FROM MEDICAL APPOINTMENTS OR FROM GETTING MEDICATIONS?: NO

## 2024-12-12 SDOH — ECONOMIC STABILITY: HOUSING INSECURITY: IN THE PAST 12 MONTHS, HOW MANY TIMES HAVE YOU MOVED WHERE YOU WERE LIVING?: 0

## 2024-12-12 SDOH — HEALTH STABILITY: MENTAL HEALTH: HOW OFTEN DO YOU HAVE A DRINK CONTAINING ALCOHOL?: NEVER

## 2024-12-12 SDOH — HEALTH STABILITY: MENTAL HEALTH: HOW MANY DRINKS CONTAINING ALCOHOL DO YOU HAVE ON A TYPICAL DAY WHEN YOU ARE DRINKING?: PATIENT DOES NOT DRINK

## 2024-12-12 SDOH — ECONOMIC STABILITY: FOOD INSECURITY: HOW HARD IS IT FOR YOU TO PAY FOR THE VERY BASICS LIKE FOOD, HOUSING, MEDICAL CARE, AND HEATING?: NOT HARD AT ALL

## 2024-12-12 SDOH — SOCIAL STABILITY: SOCIAL INSECURITY: ARE YOU MARRIED, WIDOWED, DIVORCED, SEPARATED, NEVER MARRIED, OR LIVING WITH A PARTNER?: NEVER MARRIED

## 2024-12-12 SDOH — ECONOMIC STABILITY: FOOD INSECURITY: WITHIN THE PAST 12 MONTHS, YOU WORRIED THAT YOUR FOOD WOULD RUN OUT BEFORE YOU GOT THE MONEY TO BUY MORE.: NEVER TRUE

## 2024-12-12 SDOH — ECONOMIC STABILITY: FOOD INSECURITY: WITHIN THE PAST 12 MONTHS, THE FOOD YOU BOUGHT JUST DIDN'T LAST AND YOU DIDN'T HAVE MONEY TO GET MORE.: NEVER TRUE

## 2024-12-12 ASSESSMENT — COGNITIVE AND FUNCTIONAL STATUS - GENERAL
DAILY ACTIVITIY SCORE: 24
DAILY ACTIVITIY SCORE: 24
MOBILITY SCORE: 24
MOBILITY SCORE: 24

## 2024-12-12 ASSESSMENT — PAIN - FUNCTIONAL ASSESSMENT
PAIN_FUNCTIONAL_ASSESSMENT: 0-10
PAIN_FUNCTIONAL_ASSESSMENT: UNABLE TO SELF-REPORT
PAIN_FUNCTIONAL_ASSESSMENT: 0-10

## 2024-12-12 ASSESSMENT — LIFESTYLE VARIABLES
AUDIT-C TOTAL SCORE: 0
SKIP TO QUESTIONS 9-10: 1

## 2024-12-12 ASSESSMENT — PAIN SCALES - GENERAL
PAINLEVEL_OUTOF10: 7
PAINLEVEL_OUTOF10: 5 - MODERATE PAIN
PAINLEVEL_OUTOF10: 7
PAINLEVEL_OUTOF10: 4
PAINLEVEL_OUTOF10: 5 - MODERATE PAIN
PAINLEVEL_OUTOF10: 6
PAINLEVEL_OUTOF10: 5 - MODERATE PAIN

## 2024-12-12 ASSESSMENT — ACTIVITIES OF DAILY LIVING (ADL)
LACK_OF_TRANSPORTATION: NO
LACK_OF_TRANSPORTATION: NO

## 2024-12-12 ASSESSMENT — PAIN DESCRIPTION - LOCATION
LOCATION: JAW
LOCATION: FACE

## 2024-12-12 NOTE — PROGRESS NOTES
12/12/24 1033   Discharge Planning   Living Arrangements Parent   Support Systems Parent   Assistance Needed none   Type of Residence Private residence   Number of Stairs to Enter Residence 1   Number of Stairs Within Residence 12   Do you have animals or pets at home? Yes   Type of Animals or Pets one dog and three cats   Who is requesting discharge planning? Provider   Home or Post Acute Services None   Expected Discharge Disposition Home   Does the patient need discharge transport arranged? No   Financial Resource Strain   How hard is it for you to pay for the very basics like food, housing, medical care, and heating? Not hard   Housing Stability   In the last 12 months, was there a time when you were not able to pay the mortgage or rent on time? N   In the past 12 months, how many times have you moved where you were living? 0   At any time in the past 12 months, were you homeless or living in a shelter (including now)? N   Transportation Needs   In the past 12 months, has lack of transportation kept you from medical appointments or from getting medications? no   In the past 12 months, has lack of transportation kept you from meetings, work, or from getting things needed for daily living? No   Stroke Family Assessment   Stroke Family Assessment Needed No   Intensity of Service   Intensity of Service 0-30 min     12/12/24 1034  Met with patient at bedside to discuss discharge planning.  Patient lives at home with her parents in a house. She is independent with ADLs and drives at baseline.  She does not use any assistive devices for ambulation. She plans on returning home at discharge.  She denies any HHC, DME, or discharge needs.  She will notify the [facility] should any needs arise.  ADOD tomorrow.  Aditi Mackey RN [facility]

## 2024-12-12 NOTE — PROGRESS NOTES
"S  Patient is a 23 y/o female who is HOD 2 POD 1 s/p bilateral temporomandibular joint arthroplasty and reconstruction with alloplastic implants, bilateral coronoidectomy, abdominal fat harvest and graft, LeFort I osteotomy with Dr. Medley. Patient is currently recovering on RNF.  Patient had a bout of nausea and vomiting while obtaining CT Facial Bones last night. Patient reports the vomit consisted of heme and has since not felt nauseous. Patient reports adequate rest overnight. Patient reports pain at 5/10. Patient reports no dyspnea or dysphagia. Patient reports she removed nasal canula while sleeping. Patient was able to micturate last night but has not experienced flatulence or bowel movement.      O  Physical Exam  Physical Exam   Constitutional: AAOX3, resting comfortably in bed  NEURO: Alert and oriented x3, no gross motor or sensory deficits.  CV: RRR  PULM: Breathing comfortably on RA  GI: Site of fat harvesting on right abdomen is WNL post surgically  Skin: Warm and dry. No rashes or lesions noted.  Eyes: PERRL, EOMI. clear sclera  ENMT: MMM  Head:   Kerlix head wrap in place  B/l wound dressings show mild strikethrough  B/l facial swelling appreciated concentrated at midface  B/l CN V2 is hypoesthetic  Otherwise CN V and VII are intact and equal b/l  Marginal mandibular branch of CN VII is equal and intact b/l  Frontal branch of CN VII is equal and intact b/l  Mouth:  Intra oral incision is CDI  Occulusion is stable and reproducible  Rubber bands in place across dentition  Maxillary capillary refill is WNL post Lefort I  Neck:  Soft to palpation  Trachea midline  FOM soft, non elevated b/l  Extremities: ESPOSITO  Bottom right heel shows erythematous skin consistent with compression sleeve   PSYCH: Appropriate mood and behavior      Last Recorded Vitals  Blood pressure 112/72, pulse 81, temperature 36.8 °C (98.2 °F), resp. rate 18, height 1.651 m (5' 5\"), weight 58.2 kg (128 lb 4.9 oz), SpO2 " 95%.    Relevant Results  Admission on 12/11/2024   Component Date Value Ref Range Status    Preg Test, Ur 12/11/2024 Negative  Negative Final    Glucose 12/11/2024 210 (H)  74 - 99 mg/dL Final    Sodium 12/11/2024 136  136 - 145 mmol/L Final    Potassium 12/11/2024 4.1  3.5 - 5.3 mmol/L Final    Chloride 12/11/2024 109 (H)  98 - 107 mmol/L Final    Bicarbonate 12/11/2024 21  21 - 32 mmol/L Final    Anion Gap 12/11/2024 10  10 - 20 mmol/L Final    Urea Nitrogen 12/11/2024 16  6 - 23 mg/dL Final    Creatinine 12/11/2024 0.68  0.50 - 1.05 mg/dL Final    eGFR 12/11/2024 >90  >60 mL/min/1.73m*2 Final    Calculations of estimated GFR are performed using the 2021 CKD-EPI Study Refit equation without the race variable for the IDMS-Traceable creatinine methods.  https://jasn.asnjournals.org/content/early/2021/09/22/ASN.1094991800    Calcium 12/11/2024 7.4 (L)  8.6 - 10.3 mg/dL Final    Extra Tube 12/11/2024 Hold for add-ons.   Final    Auto resulted.        I/O last 3 completed shifts:  In: 2200 (37.8 mL/kg) [IV Piggyback:2200]  Out: 800 (13.7 mL/kg) [Urine:600 (0.3 mL/kg/hr); Blood:200]  Weight: 58.2 kg   I/O this shift:  In: 250 [I.V.:250]  Out: 600 [Urine:600]       CT Facial Bones 12/11  Read Pending      Assessment/Plan   Patient is a 25 y/o female who is HOD 2 POD 1 s/p bilateral temporomandibular joint arthroplasty and reconstruction with alloplastic implants, bilateral coronoidectomy, abdominal fat harvest and graft, LeFort I osteotomy with Dr. Medley. Patient to continue recovery on RNF. Encouraged patient to continue adequate oral hydration and to ambulate as tolerated.      Plan:     Neuro:  - Tylenol 650mg q6h scheduled  - Oxycodone 5/10mg q4h PRN moderate/severe pain  - Ibuprofen 600 mg q8h  - Dilaudid .5mg q2hr PRN breakthrough pain  - Continue home meds :   - Buspar 5 mg BID   - Methylphenidate 27 mg once daily     OMFS:  - Wire cutters at bedside  - 60cc syringe and red rubber catheter at bedside  - Unasyn  3g q6hrs   - Elevate head of bed 30 degrees  - Peridex mouth rinse TID  - Lactated Ringer 50 mL per/hr  - Afrin and Ocean nasal spray PRN nasal congestion        Respiratory:  - Nasal cannula 2L to maintain sats >92%  - Supplemental O2 must be humidified  - Continuous pulse ox     GI:  - Clear liquid diet  - Zofran 4 mg q 6h PRN   - Antonella-colace 8.6-50 mg nightly     Endo:  - Decadron 8mg q8h x3 doses     DVT ppx:  - Lovenox 40mg subcutaneous  - SCD’s  - Encourage OOB to chair and ambulation     Dispo:  RNF        Please page OMFS at [phone] with any issues/concerns. If any issue with contacting via pager, please contact Natasha Tatum via [employer].      2nd call to contact via [employer] is Hemal Leija  3rd call via [employer] is Ntaasha Tatum, DMD

## 2024-12-12 NOTE — CARE PLAN
The patient's goals for the shift include      The clinical goals for the shift include remain safe, keep airway open, and be comfortable as possible throughout shift      Problem: Pain - Adult  Goal: Verbalizes/displays adequate comfort level or baseline comfort level  Outcome: Progressing     Problem: Safety - Adult  Goal: Free from fall injury  Outcome: Progressing     Problem: Discharge Planning  Goal: Discharge to home or other facility with appropriate resources  Outcome: Progressing     Problem: Chronic Conditions and Co-morbidities  Goal: Patient's chronic conditions and co-morbidity symptoms are monitored and maintained or improved  Outcome: Progressing     Problem: Pain  Goal: Takes deep breaths with improved pain control throughout the shift  Outcome: Progressing  Goal: Turns in bed with improved pain control throughout the shift  Outcome: Progressing  Goal: Walks with improved pain control throughout the shift  Outcome: Progressing  Goal: Performs ADL's with improved pain control throughout shift  Outcome: Progressing  Goal: Participates in PT with improved pain control throughout the shift  Outcome: Progressing  Goal: Free from opioid side effects throughout the shift  Outcome: Progressing  Goal: Free from acute confusion related to pain meds throughout the shift  Outcome: Progressing

## 2024-12-12 NOTE — PROGRESS NOTES
Pharmacy Medication History     Additional concerns with the patient's PTA list.   Per patient.     The following updates were made to the Prior to Admission medication list:     Source of Information:     Medications ADDED:   N/a  Medications CHANGED:  N/a  Medications REMOVED:   N/a  Medications NOT TAKING:   N/a     Allergy reviewed : Yes    Meds 2 Beds : Yes    Comments: Per patient      The list below reflectives the updated PTA list. Please review each medication in order reconciliation for additional clarification and justification.    Prior to Admission Medications   Prescriptions Last Dose Informant   busPIRone (Buspar) 5 mg tablet 12/10/2024 Bedtime    Sig: Take 1 tablet (5 mg) by mouth 2 times a day.   chlorhexidine (Peridex) 0.12 % solution 12/11/2024 Morning    Sig: Use 15 mL in the mouth or throat if needed for wound care for up to 14 days.   methylphenidate ER 27 mg oral extended release tablet 12/10/2024 Bedtime    Sig: Take 1 tablet (27 mg) by mouth once daily. Do not crush, chew, or split      Facility-Administered Medications Last Administration Doses Remaining   chlorhexidine (Hibiclens) 4 % liquid None recorded           The list below reflectives the updated allergy list. Please review each documented allergy for additional clarification and justification.    No Known Allergies       12/12/24 at 11:20 AM - Sussy Frye

## 2024-12-12 NOTE — SIGNIFICANT EVENT
"S  Patient is a 23 y/o female who is HOD 1 POD 0 s/p bilateral temporomandibular joint arthroplasty and reconstruction with alloplastic implants, bilateral coronoidectomy, abdominal fat harvest and graft, LeFort I osteotomy with Dr. Medley. Patient is currently recovering on RNF. Patient reports resting since the completion of surgery. Patient reports swelling and moderate pain at 8/10, with pain medications to be delivered by nursing staff shortly. Patient reports no urge to urinate, pass flatulence, or experience a bowel movement.     O  Physical Exam  Physical Exam   Constitutional: AAOX3, resting comfortably in bed  NEURO: Alert and oriented x3, no gross motor or sensory deficits.  CV: RRR  PULM: Breathing comfortably on RA  GI: Site of fat harvesting on right abdomen is WNL post surgically  Skin: Warm and dry. No rashes or lesions noted.  Eyes: PERRL, EOMI. clear sclera  ENMT: MMM  Head:   Kerlix wrap in place  B/l wound dressings show mild strikethrough  B/l facial swelling appreciated concentrated at midface  B/l CN V2 is hypoesthetic  Otherwise CN V and VII are intact and equal b/l  Marginal mandibular branch of CN VII is equal and intact b/l  Mouth:  Intra oral incision is CDI  Occulusion is stable and reproducible  Rubber bands in place across dentition  Maxillary capillary refill is WNL post Lefort I  Neck:  Soft to palpation  Trachea midline  FOM soft, non elevated b/l  Extremities: ESPOSITO  PSYCH: Appropriate mood and behavior      Last Recorded Vitals  Blood pressure 116/72, pulse 59, temperature 36.7 °C (98.1 °F), temperature source Temporal, resp. rate 18, height 1.651 m (5' 5\"), weight 58.2 kg (128 lb 4.9 oz), SpO2 97%.    Relevant Results  Admission on 12/11/2024   Component Date Value Ref Range Status    Preg Test, Ur 12/11/2024 Negative  Negative Final    Glucose 12/11/2024 210 (H)  74 - 99 mg/dL Final    Sodium 12/11/2024 136  136 - 145 mmol/L Final    Potassium 12/11/2024 4.1  3.5 - 5.3 mmol/L " Final    Chloride 12/11/2024 109 (H)  98 - 107 mmol/L Final    Bicarbonate 12/11/2024 21  21 - 32 mmol/L Final    Anion Gap 12/11/2024 10  10 - 20 mmol/L Final    Urea Nitrogen 12/11/2024 16  6 - 23 mg/dL Final    Creatinine 12/11/2024 0.68  0.50 - 1.05 mg/dL Final    eGFR 12/11/2024 >90  >60 mL/min/1.73m*2 Final    Calculations of estimated GFR are performed using the 2021 CKD-EPI Study Refit equation without the race variable for the IDMS-Traceable creatinine methods.  https://jasn.asnjournals.org/content/early/2021/09/22/ASN.7367967472    Calcium 12/11/2024 7.4 (L)  8.6 - 10.3 mg/dL Final    Extra Tube 12/11/2024 Hold for add-ons.   Final    Auto resulted.          Assessment/Plan   Patient is a 25 y/o female who is HOD 1 POD 0 s/p bilateral temporomandibular joint arthroplasty and reconstruction with alloplastic implants, bilateral coronoidectomy, abdominal fat harvest and graft, LeFort I osteotomy with Dr. Medley. Patient to continue recovery overnight on RNF.     Plan:    Neuro:  - Tylenol 650mg q6h scheduled  - Oxycodone 5/10mg q4h PRN moderate/severe pain  - Ibuprofen 600 mg q8h  - Dilaudid .5mg q2hr PRN breakthrough pain    OMFS:  - Wire cutters at bedside  - 60cc syringe and red rubber catheter at bedside  - Unasyn 3g q6hrs   - Elevate head of bed 30 degrees  - Peridex mouth rinse TID  - Lactated Ringer 50 mL per/hr      Respiratory:  - Nasal cannula 2L to maintain sats >92%  - Supplemental O2 must be humidified  - Continuous pulse ox    GI:  - Clear liquid diet  - Zofran 4 mg q 6h PRN     Endo:  - Decadron 8mg q8h x3 doses    DVT ppx:  - Lovenox 40mg subcutaneous  - SCD’s  - Encourage OOB to chair and ambulation    Dispo:  [facility]      Please page OMFS at [phone] with any issues/concerns. If any issue with contacting via pager, please contact Natasha Tatum via [employer].      2nd call to contact via [employer] is Hemal Leija  3rd call via [employer] is Natasha Tatum, DMD

## 2024-12-13 ENCOUNTER — PHARMACY VISIT (OUTPATIENT)
Dept: PHARMACY | Facility: CLINIC | Age: 24
End: 2024-12-13
Payer: MEDICARE

## 2024-12-13 VITALS
OXYGEN SATURATION: 98 % | RESPIRATION RATE: 16 BRPM | TEMPERATURE: 97.6 F | HEIGHT: 65 IN | SYSTOLIC BLOOD PRESSURE: 101 MMHG | HEART RATE: 87 BPM | WEIGHT: 128.31 LBS | DIASTOLIC BLOOD PRESSURE: 64 MMHG | BODY MASS INDEX: 21.38 KG/M2

## 2024-12-13 PROCEDURE — 2500000001 HC RX 250 WO HCPCS SELF ADMINISTERED DRUGS (ALT 637 FOR MEDICARE OP)

## 2024-12-13 PROCEDURE — 7100000011 HC EXTENDED STAY RECOVERY HOURLY - NURSING UNIT

## 2024-12-13 PROCEDURE — RXMED WILLOW AMBULATORY MEDICATION CHARGE

## 2024-12-13 PROCEDURE — 2500000004 HC RX 250 GENERAL PHARMACY W/ HCPCS (ALT 636 FOR OP/ED)

## 2024-12-13 RX ORDER — OXYMETAZOLINE HCL 0.05 %
2 SPRAY, NON-AEROSOL (ML) NASAL EVERY 12 HOURS PRN
Qty: 30 ML | Refills: 0 | Status: SHIPPED | OUTPATIENT
Start: 2024-12-13

## 2024-12-13 RX ORDER — IBUPROFEN 600 MG/1
600 TABLET ORAL EVERY 6 HOURS PRN
Qty: 40 TABLET | Refills: 0 | Status: SHIPPED | OUTPATIENT
Start: 2024-12-13 | End: 2024-12-23

## 2024-12-13 RX ORDER — ACETAMINOPHEN 500 MG
1000 TABLET ORAL EVERY 6 HOURS PRN
Qty: 40 TABLET | Refills: 0 | Status: SHIPPED | OUTPATIENT
Start: 2024-12-13 | End: 2024-12-23

## 2024-12-13 RX ORDER — AMOXICILLIN AND CLAVULANATE POTASSIUM 875; 125 MG/1; MG/1
875 TABLET, FILM COATED ORAL 2 TIMES DAILY
Qty: 20 TABLET | Refills: 0 | Status: SHIPPED | OUTPATIENT
Start: 2024-12-13 | End: 2024-12-23

## 2024-12-13 RX ORDER — ONDANSETRON 4 MG/1
4 TABLET, FILM COATED ORAL EVERY 8 HOURS PRN
Qty: 10 TABLET | Refills: 0 | Status: SHIPPED | OUTPATIENT
Start: 2024-12-13 | End: 2024-12-17

## 2024-12-13 RX ORDER — BACITRACIN 500 [USP'U]/G
OINTMENT TOPICAL 2 TIMES DAILY
Qty: 28.4 G | Refills: 0 | Status: SHIPPED | OUTPATIENT
Start: 2024-12-13

## 2024-12-13 RX ORDER — OXYCODONE HYDROCHLORIDE 5 MG/1
5 TABLET ORAL EVERY 6 HOURS PRN
Qty: 20 TABLET | Refills: 0 | Status: SHIPPED | OUTPATIENT
Start: 2024-12-13 | End: 2024-12-18

## 2024-12-13 RX ORDER — AMOXICILLIN 250 MG
1 CAPSULE ORAL DAILY
Qty: 10 TABLET | Refills: 0 | Status: SHIPPED | OUTPATIENT
Start: 2024-12-13 | End: 2024-12-23

## 2024-12-13 RX ORDER — MULTIVIT WITH MINERALS/HERBS
TABLET ORAL DAILY
Qty: 10 TABLET | Refills: 0 | Status: SHIPPED | OUTPATIENT
Start: 2024-12-13

## 2024-12-13 RX ORDER — METHYLPREDNISOLONE 4 MG/1
TABLET ORAL
Qty: 21 TABLET | Refills: 0 | Status: SHIPPED | OUTPATIENT
Start: 2024-12-13 | End: 2024-12-19

## 2024-12-13 RX ADMIN — OXYCODONE HYDROCHLORIDE 10 MG: 5 TABLET ORAL at 06:29

## 2024-12-13 RX ADMIN — AMPICILLIN SODIUM AND SULBACTAM SODIUM 3 G: 2; 1 INJECTION, POWDER, FOR SOLUTION INTRAMUSCULAR; INTRAVENOUS at 11:44

## 2024-12-13 RX ADMIN — ACETAMINOPHEN 650 MG: 650 LIQUID ORAL at 11:44

## 2024-12-13 RX ADMIN — AMPICILLIN SODIUM AND SULBACTAM SODIUM 3 G: 2; 1 INJECTION, POWDER, FOR SOLUTION INTRAMUSCULAR; INTRAVENOUS at 06:27

## 2024-12-13 RX ADMIN — DEXAMETHASONE SODIUM PHOSPHATE 8 MG: 4 INJECTION, SOLUTION INTRAMUSCULAR; INTRAVENOUS at 06:27

## 2024-12-13 RX ADMIN — ACETAMINOPHEN 650 MG: 325 TABLET, FILM COATED ORAL at 06:29

## 2024-12-13 RX ADMIN — IBUPROFEN 600 MG: 600 TABLET, FILM COATED ORAL at 09:09

## 2024-12-13 RX ADMIN — CHLORHEXIDINE GLUCONATE 15 ML: 1.2 SOLUTION ORAL at 09:09

## 2024-12-13 RX ADMIN — AMPICILLIN SODIUM AND SULBACTAM SODIUM 3 G: 2; 1 INJECTION, POWDER, FOR SOLUTION INTRAMUSCULAR; INTRAVENOUS at 00:05

## 2024-12-13 RX ADMIN — BUSPIRONE HYDROCHLORIDE 5 MG: 5 TABLET ORAL at 09:09

## 2024-12-13 RX ADMIN — OXYCODONE HYDROCHLORIDE 10 MG: 5 TABLET ORAL at 12:32

## 2024-12-13 ASSESSMENT — PAIN SCALES - GENERAL
PAINLEVEL_OUTOF10: 0 - NO PAIN
PAINLEVEL_OUTOF10: 0 - NO PAIN
PAINLEVEL_OUTOF10: 7
PAINLEVEL_OUTOF10: 7

## 2024-12-13 ASSESSMENT — COGNITIVE AND FUNCTIONAL STATUS - GENERAL
DAILY ACTIVITIY SCORE: 24
MOBILITY SCORE: 24
DAILY ACTIVITIY SCORE: 24
MOBILITY SCORE: 24
DAILY ACTIVITIY SCORE: 24
DAILY ACTIVITIY SCORE: 24

## 2024-12-13 ASSESSMENT — PAIN - FUNCTIONAL ASSESSMENT
PAIN_FUNCTIONAL_ASSESSMENT: 0-10
PAIN_FUNCTIONAL_ASSESSMENT: 0-10

## 2024-12-13 ASSESSMENT — PAIN SCALES - WONG BAKER: WONGBAKER_NUMERICALRESPONSE: HURTS LITTLE MORE

## 2024-12-13 NOTE — DISCHARGE SUMMARY
Discharge Diagnosis  Arthralgia of temporomandibular joint    Issues Requiring Follow-Up  Post surgical follow up    Test Results Pending At Discharge  N/A      Hospital Course  Patient presented on HOD 1 in preparation for procedure. Patient presented to PACU for further preparation for OR, in which bilateral temporomandibular joint arthroplasty and reconstruction with alloplastic implants, bilateral coronoidectomy, abdominal fat harvest and graft, LeFort I osteotomy  was performed by Dr. Medley. Patient was subsequently recovered in PACU uneventfully. Patient was transferred to [facility] for further monitoring of post surgical recovery. No acute events overnight. On HOD 2, patient continued recovery on [facility] with no significant events. No acute events overnight. On HOD 3, patient was discharged home, with follow up scheduled at [facility] outpatient clinic listed below.     Pertinent Physical Exam At Time of Discharge  Physical Exam  Constitutional: AAOX3, resting comfortably in bed  NEURO: Alert and oriented x3, no gross motor or sensory deficits.  CV: RRR  PULM: Breathing comfortably on RA  GI: Site of fat harvesting on right abdomen is WNL post surgically  Skin: Warm and dry. No rashes or lesions noted.  Eyes: PERRL, EOMI. clear sclera  ENMT: MMM  Head:   B/l wound dressings in place  B/l facial swelling appreciated concentrated at midface  B/l CN V2 is hypoesthetic  Otherwise CN V and VII are intact and equal b/l  Marginal mandibular branch of CN VII is equal and intact b/l  Frontal branch of CN VII is equal and intact b/l  Mouth:  Intra oral incision is CDI  Occulusion is stable and reproducible  Rubber bands in place across dentition  Maxillary capillary refill is WNL post Lefort I osteotomy  FOM soft, non elevated b/l  Neck:  Soft to palpation  Trachea midline  Extremities: ESPOSITO   PSYCH: Appropriate mood and behavior    Home Medications     Medication List      START taking these medications     acetaminophen 500 mg  tablet; Commonly known as: Tylenol; Take 2 tablets   (1,000 mg) by mouth every 6 hours if needed for mild pain (1 - 3) for up   to 10 days.   amoxicillin-pot clavulanate 875-125 mg tablet; Commonly known as:   Augmentin; Take 1 tablet (875 mg) by mouth 2 times a day for 10 days.   b complex vitamins capsule; Take 1 capsule by mouth once daily for 10   days.   bacitracin 500 unit/gram ointment; Apply topically 2 times a day.   ibuprofen 600 mg tablet; Take 1 tablet (600 mg) by mouth every 6 hours   if needed for moderate pain (4 - 6) for up to 10 days.   methylPREDNISolone 4 mg tablets; Commonly known as: Medrol Dospak;   Follow schedule on package instructions   ondansetron 4 mg tablet; Commonly known as: Zofran; Take 1 tablet (4 mg)   by mouth every 8 hours if needed for nausea or vomiting for up to 3 days.   oxyCODONE 5 mg immediate release tablet; Commonly known as: Roxicodone;   Take 1 tablet (5 mg) by mouth every 6 hours if needed for severe pain (7 -   10) for up to 5 days.   oxymetazoline 0.05 % nasal spray; Commonly known as: Afrin; Administer 2   sprays into each nostril every 12 hours if needed for congestion. Do not   use for more than 3 days.   sennosides-docusate sodium 8.6-50 mg tablet; Commonly known as:   Antonella-Colace; Take 1 tablet by mouth once daily for 10 days.   sodium chloride 0.65 % nasal spray; Commonly known as: Ocean; Administer   1 spray into each nostril 4 times a day as needed for congestion.     CONTINUE taking these medications     busPIRone 5 mg tablet; Commonly known as: Buspar   chlorhexidine 0.12 % solution; Commonly known as: Peridex; Use 15 mL in   the mouth or throat if needed for wound care for up to 14 days.   methylphenidate ER 27 mg extended release tablet       Outpatient Follow-Up  Follow up will occur in [facility] [place] [facility] on 12/17/24 at 10:00 AM.   Clinic is located at:  Department of Oral & Maxillofacial Surgery  [address] [place] [address]  [facility] [place] [address]    Office phone number: [phone].  Office fax number: [phone].  Team Pager: [phone].  Patients can contact the [id number] through the hospital  [phone].      Natasha Tatum, DMD

## 2024-12-13 NOTE — DISCHARGE INSTRUCTIONS
DEPARTMENT OF ORAL & MAXILLOFACIAL SURGERY  DISCHARGE INSTRUCTIONS    C O N F I D E N T I A L   I N F O R M A T I O N  -------------------------------------------------------------------------------------------------------------------    Dalila Pereira  [id number]    The following is a brief overview of your hospitalization. Some of the information contained on this summary may be confidential.  This information should be kept in your records and should be shared with your regular doctor.    Admission Date:12/11/2024  5:42 AM  Discharge Date: No discharge date for patient encounter.    Disposition:  Home    PRINCIPAL DIAGNOSIS (reason after study for this admission): b/l TMJ arthralgia, mandibular hypoplasia    Physicians:   Dr. Medley    Operations performed while hospitalized:   bilateral temporomandibular joint arthroplasty and reconstruction with alloplastic implants, bilateral coronoidectomy, abdominal fat harvest and graft, LeFort I osteotomy     Additional findings:   N/a    Treatment/wound care:   Keep area(s) clean and dry.   It is okay to shower 48 hours after time of surgery.    Do not scrub wound(s), pat dry.    Do not submerge wound(s) in standing water until seen in followup (no tub bathing, swimming, or hot tubs).    Please visually inspect your wound(s) at least once daily.  If the wound(s) are in a difficult to see location, please use a mirror or have someone else assist with visual inspection.    If you have sutures that you can see outside of the skin or staples:  Please have staples/sutures removed in our [id number] days after the date of surgery.  Do not remove the staples/sutures on your own.  Return sooner or call if wound(s) or surrounding area have increased swelling, pain, warmth, redness, or drainage that is thick, yellow and/or green.    Pending results:   N/a    Pain Control:    Adequate management:   Oxycodone can be taken as prescribed as needed for breakthrough pain.    Oxycodone is  a narcotic, which can induce constipation.   Please take stool softeners when taking this medication to prevent constipation.    Activity after Discharge:   No heavy lifting, weight bearing as tolerated, no driving until mobility is returned to normal.   Do not submerge wound(s) in standing water for 7 days after surgery (no tub bathing, swimming, or hot tubs).   Do not drive or operate heavy machinery while taking narcotic pain medications as these medications can alter perception, impair judgement, and slow reaction times.    Diet: Liquid Diet    Additional Instructions:     Please practice sinus precautions for the next 2 weeks:  Avoid blowing your nose or breathing in strongly through your nose  Please avoid straining with exertion, heavy lifting, or straining during bowel movements  Please avoid using a straw  Avoid smoking any tobacco or other substance  Use afrin spray as needed for decongestion, do not use for more than 3 days       Follow up will occur in [facility] outpatient clinic on 12/17/24 at 10:00 AM. Clinic is located at:  Department of Oral & Maxillofacial Surgery  [address] [place] [address], 1st Floor ([facility])  [place] [address]    Office phone number: [phone].  Office fax number: [phone].  Team Pager: [phone].  Patients can contact the [email or web address] through the hospital  [phone].

## 2024-12-13 NOTE — CARE PLAN
The patient's goals for the shift include      The clinical goals for the shift include Patient will have decreased swelling    Over the shift, the patient did not make progress toward the following goals. Barriers to progression include bilateral jaw mandibleotomy. Recommendations to address these barriers include ice and pain meds as needed.

## 2024-12-24 LAB
LABORATORY COMMENT REPORT: NORMAL
PATH REPORT.FINAL DX SPEC: NORMAL
PATH REPORT.GROSS SPEC: NORMAL
PATH REPORT.RELEVANT HX SPEC: NORMAL
PATH REPORT.TOTAL CANCER: NORMAL

## (undated) DEVICE — SUTURE, VICRYL, 3-0,18 IN, SH, UNDYED

## (undated) DEVICE — STAPLER, SKIN PROXIMATE, 35 WIDE

## (undated) DEVICE — Device

## (undated) DEVICE — DRESSING, NON-ADHERENT, TELFA, OUCHLESS, 3 X 8 IN, STERILE

## (undated) DEVICE — STRIP, SKIN CLOSURE, STERI STRIP, REINFORCED, 0.5 X 4 IN

## (undated) DEVICE — BATTERY, VARISPEED

## (undated) DEVICE — GLOVE, SURGICAL, PROTEXIS PI ORTHO, 7.0, PF, LF

## (undated) DEVICE — SPONGE, LAP, XRAY DECT, 4IN X 18IN, W/MASTER DMT, STERILE

## (undated) DEVICE — ELECTRODE, ELECTROSURGICAL, NEEDLE, MODIFIED, BLUNT, NONSTICK, 2.75 IN

## (undated) DEVICE — TRAY, MINOR, SINGLE BASIN, STERILE

## (undated) DEVICE — SEALER, BIPOLAR, AQUA MANTYS MIS FLEX MINI

## (undated) DEVICE — DRAPE, INCISE, ANTIMICROBIAL, IOBAN 2, LARGE, 17 X 23 IN, DISPOSABLE, STERILE

## (undated) DEVICE — COUNTER, NEEDLE, FOAM BLOCK, POP-N-COUNT, W/BLADEGUARD, W/ADHESIVE 40 COUNT, RED

## (undated) DEVICE — DRAPE, TOWEL, STERI DRAPE, 17 X 11 IN, PLASTIC, STERILE

## (undated) DEVICE — CORD, CAUTERY, BIOPOLAR FORCEP, 12FT

## (undated) DEVICE — IMPLANTABLE DEVICE: Type: IMPLANTABLE DEVICE | Site: HARD PALATE | Status: NON-FUNCTIONAL

## (undated) DEVICE — CASSETTE, SONOPET IQ IRRIGATION SUCTION

## (undated) DEVICE — DRAPE, SHEET, UTILITY, NON ABSORBENT, 18 X 26 IN, LF

## (undated) DEVICE — DRAPE, INSTRUMENT, MAGNETIC, LARGE, 9.5 X 16 IN, STERILE

## (undated) DEVICE — DRAPE, INSTRUMENT, W/POUCH, STERI DRAPE, 7 X 11 IN, DISPOSABLE, STERILE

## (undated) DEVICE — SUPPORT, FACIOPLASTY, CROWN/CHIN, MEDIUM, OVER 27 IN, ELASTIC

## (undated) DEVICE — DRILL, STRYKER 1.6 X 58 TWIST

## (undated) DEVICE — DRESSING, TRANSPARENT, TEGADERM, 2-3/8 X 2-3/4 IN

## (undated) DEVICE — SUTURE, VICRYL, 3-0, 18 IN, PS2, UNDYED

## (undated) DEVICE — ELECTRODE, ELECTROSURGICAL, GUARDED TIP

## (undated) DEVICE — COVER, TABLE, 44 X 75 IN, DISPOSABLE, LF, STERILE

## (undated) DEVICE — SUTURE, SILK, 3-0, 30 IN, RB-1, BLACK

## (undated) DEVICE — WOUND SYSTEM, DEBRIDEMENT & CLEANING, O.R DUOPAK

## (undated) DEVICE — DRESSING, GAUZE, PETROLATUM, PATCH, XEROFORM, 1 X 8 IN, STERILE

## (undated) DEVICE — SPONGE GAUZE, XRAY SC+RFID, 4X4 16 PLY, STERILE

## (undated) DEVICE — DRESSING, GAUZE, FLUFF, 1 PLY, 18 X 36 IN

## (undated) DEVICE — DEVICE, STIMULATOR, NERVE LOCATOR, ST

## (undated) DEVICE — CONTAINER, SPECIMEN, 120 ML, STERILE

## (undated) DEVICE — SUTURE, MONOCRYL, 4-0, 18 IN, PS2, UNDYED

## (undated) DEVICE — SUTURE, D, SPECIAL, 1, STEEL, 18, SILVER"

## (undated) DEVICE — VERSALIGHT MINI, W/EXTENDED FRAZIER TIP, W/NO SUCTION OR IRRIGATION

## (undated) DEVICE — SEALANT, HEMOSTATIC, FLOSEAL, 10 ML

## (undated) DEVICE — GLOVE, SURGICAL, PROTEXIS PI BLUE W/NEUTHERA, 7.0, PF, LF

## (undated) DEVICE — DRAPE, SHEET, THREE QUARTER, FAN FOLD, 57 X 77 IN

## (undated) DEVICE — PREP TRAY, VAGINAL

## (undated) DEVICE — KNIFE, IQ APEX, 11CM

## (undated) DEVICE — GOWN, ASTOUND, L

## (undated) DEVICE — BLADE, SAW, RECIPROCATING, TAPER, 27 X 0.64 MM

## (undated) DEVICE — PAD, GROUNDING, ELECTROSURGICAL, W/9 FT CABLE, POLYHESIVE II, ADULT, LF

## (undated) DEVICE — ADMINISTRATION SET, VENTED, FLASHBALL, 109 IN

## (undated) DEVICE — BAG, PRESSURE INFUSER, 3000 CC, LF

## (undated) DEVICE — SUTURE, VICRYL, 2-0, 27 IN, SH, UNDYED

## (undated) DEVICE — TOWEL, SURGICAL, NEURO, O/R, 16 X 26, BLUE, STERILE

## (undated) DEVICE — VSP ORTHOGNATHIC BUNDLE

## (undated) DEVICE — BUR, SOLID OVAL, CARBIDE, MEDIUM, 4MM